# Patient Record
Sex: MALE | Race: WHITE | Employment: OTHER | URBAN - METROPOLITAN AREA
[De-identification: names, ages, dates, MRNs, and addresses within clinical notes are randomized per-mention and may not be internally consistent; named-entity substitution may affect disease eponyms.]

---

## 2018-09-26 ENCOUNTER — HOSPITAL ENCOUNTER (OUTPATIENT)
Dept: VASCULAR SURGERY | Age: 83
Discharge: HOME | End: 2018-09-26
Attending: OPHTHALMOLOGY
Payer: MEDICARE

## 2018-09-26 ENCOUNTER — HOSPITAL ENCOUNTER (OUTPATIENT)
Dept: NON INVASIVE DIAGNOSTICS | Age: 83
Discharge: HOME | End: 2018-09-26
Attending: OPHTHALMOLOGY
Payer: MEDICARE

## 2018-09-26 DIAGNOSIS — H34.231 BRANCH RETINAL ARTERY OCCLUSION OF RIGHT EYE: ICD-10-CM

## 2018-09-26 PROCEDURE — 93306 TTE W/DOPPLER COMPLETE: CPT

## 2018-09-26 PROCEDURE — 93880 EXTRACRANIAL BILAT STUDY: CPT

## 2018-09-26 NOTE — PROCEDURES
[place]  *** FINAL REPORT ***    Name: David Young  MRN: [id number]    Outpatient  : 21 Mar 1935  HIS Order #: [id number]  [id number] [facility] Visit #: [id number]  Date: 26 Sep 2018    TYPE OF TEST: Cerebrovascular Duplex    REASON FOR TEST    Right Carotid:-             Proximal               Mid                 Distal  cm/s  Systolic  Diastolic  Systolic  Diastolic  Systolic  Diastolic  CCA:     40.3      15.0                            54.0      22.0  Bulb:  ECA:    100.0      20.0  ICA:     41.0      15.0       46.0      20.0       56.0      23.0  ICA/CCA:  0.8       0.7    ICA Stenosis:    Right Vertebral:-  Finding: Antegrade  Sys:       38.0  Leona:       11.0    Right Subclavian:    Left Carotid:-            Proximal                Mid                 Distal  cm/s  Systolic  Diastolic  Systolic  Diastolic  Systolic  Diastolic  CCA:     29.4      18.0                            61.0      22.0  Bulb:  ECA:     61.0      15.0  ICA:     48.0      18.0       51.0      23.0       58.0      25.0  ICA/CCA:  0.8       0.8    ICA Stenosis:    Left Vertebral:-  Finding: Antegrade  Sys:       30.0  Leona:       11.0    Left Subclavian:    INTERPRETATION/FINDINGS  PROCEDURE:  Color duplex ultrasound imaging of extracranial  cerebrovascular arteries. FINDINGS:       Right:  Internal carotid velocity is within normal limits. There   is narrowing of the internal carotid flow channel on color Doppler  imaging and non-calcific plaque on B-mode imaging, consistent with  less than 50 percent stenosis. The common and external carotid  arteries are patent and without evidence of hemodynamically  significant stenosis. Left:   Internal carotid velocity is within normal limits. There   is narrowing of the internal carotid flow channel on color Doppler  imaging and calcific plaque on B-mode imaging, consistent with less  than 50 percent stenosis.   The common and external carotid arteries  are patent and without evidence of hemodynamically significant  stenosis. IMPRESSION:  Consistent with less than 50% stenosis of the right  internal carotid and less than 50% stenosis of the left internal  carotid. Vertebrals are patent with antegrade flow. ADDITIONAL COMMENTS    I have personally reviewed the data relevant to the interpretation of  this  study. TECHNOLOGIST: Rita Diaz.  Giselle Weber  Signed: 09/26/2018 10:23 AM    PHYSICIAN: Luigi Mccartney MD  Signed: 09/27/2018 06:56 AM